# Patient Record
Sex: FEMALE | Race: WHITE | NOT HISPANIC OR LATINO | URBAN - METROPOLITAN AREA
[De-identification: names, ages, dates, MRNs, and addresses within clinical notes are randomized per-mention and may not be internally consistent; named-entity substitution may affect disease eponyms.]

---

## 2023-03-20 ENCOUNTER — TELEPHONE (OUTPATIENT)
Dept: PEDIATRICS | Facility: CLINIC | Age: 18
End: 2023-03-20
Payer: COMMERCIAL

## 2023-03-20 DIAGNOSIS — F90.9 ATTENTION DEFICIT HYPERACTIVITY DISORDER (ADHD), UNSPECIFIED ADHD TYPE: Primary | ICD-10-CM

## 2023-03-20 PROBLEM — Z86.16 HISTORY OF SEVERE ACUTE RESPIRATORY SYNDROME CORONAVIRUS 2 (SARS-COV-2) DISEASE: Status: RESOLVED | Noted: 2021-09-01 | Resolved: 2023-03-20

## 2023-03-20 PROBLEM — F40.10 SOCIAL ANXIETY DISORDER: Status: ACTIVE | Noted: 2023-03-20

## 2023-03-20 PROBLEM — E55.9 VITAMIN D DEFICIENCY: Status: RESOLVED | Noted: 2019-10-28 | Resolved: 2023-03-20

## 2023-03-20 PROBLEM — F32.A MODERATE DEPRESSIVE EPISODE: Status: ACTIVE | Noted: 2020-03-16

## 2023-03-20 PROBLEM — F90.0 ATTENTION DEFICIT HYPERACTIVITY DISORDER (ADHD), PREDOMINANTLY INATTENTIVE TYPE: Status: ACTIVE | Noted: 2018-12-10

## 2023-03-20 PROBLEM — F41.9 ANXIETY: Status: ACTIVE | Noted: 2019-03-29

## 2023-03-20 PROBLEM — E55.9 VITAMIN D DEFICIENCY: Status: ACTIVE | Noted: 2019-10-28

## 2023-03-20 NOTE — PROGRESS NOTES
Subjective   Virtual visit with  Dee Roper is a 17 y.o. female here for follow up of Attention Deficit       HPI:   Current Medication  Adderall 10mg, was rx to take 1-2.   Has been taking BID on workdays.  Likes it.  Hesitant to change to extended release because sometimes doesn't take 2nd dose and has more control with timing.  Works well,.  Recent medication changes none  Side effects none  Patient concerns really none!    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 I have considered the risks of abuse, dependence, addiction and diversion        The following portions of the chart were reviewed this encounter and updated as appropriate:         Review of Systems  Pertinent items are noted in HPI    PE  No Vitals today (virtual visit)  General: alert, active, in no acute distress  Lungs:  breathing unlabored        Assessment/Plan   Attention deficit disorder without hyperactivity    Overall, doing well on current medication.    I have personally reviewed the OARRS report for Dee Roper.  I have considered the risk of abuse, dependance, addiction, and diversion.  I believe it is clinically appropriate for this patient to continue taking this medication.  Will refill medication for an additional 3 months   Asking for rx to be sent to [facility] as 90d supply. Mom called and they are asking us to fax to   Risks/benefits /side effects of medications reviewed with patient/family  Summary:  It is my overall clinical impression that this patient is benefiting from stimulant therapy.  It is my clinical opinion that this patient will benefit from continued treatment with this current medication regimen.  The patient will continue to be treated with stimulant  medication.      Follow-up in 3 mo.  Should be In Person visit

## 2023-03-21 RX ORDER — METHYLPHENIDATE HYDROCHLORIDE 54 MG/1
54 TABLET ORAL DAILY
Qty: 7 TABLET | Refills: 0 | Status: SHIPPED | OUTPATIENT
Start: 2023-03-21 | End: 2023-03-28 | Stop reason: SINTOL

## 2023-03-25 DIAGNOSIS — F90.0 ATTENTION DEFICIT HYPERACTIVITY DISORDER (ADHD), PREDOMINANTLY INATTENTIVE TYPE: Primary | ICD-10-CM

## 2023-03-25 RX ORDER — DEXTROAMPHETAMINE SACCHARATE, AMPHETAMINE ASPARTATE, DEXTROAMPHETAMINE SULFATE, AND AMPHETAMINE SULFATE 2.5; 2.5; 2.5; 2.5 MG/1; MG/1; MG/1; MG/1
TABLET ORAL
COMMUNITY
Start: 2023-01-20 | End: 2023-03-25 | Stop reason: SDUPTHER

## 2023-03-25 RX ORDER — DEXTROAMPHETAMINE SACCHARATE, AMPHETAMINE ASPARTATE, DEXTROAMPHETAMINE SULFATE, AND AMPHETAMINE SULFATE 2.5; 2.5; 2.5; 2.5 MG/1; MG/1; MG/1; MG/1
10 TABLET ORAL DAILY
Qty: 7 TABLET | Refills: 0 | Status: SHIPPED | OUTPATIENT
Start: 2023-03-25 | End: 2023-03-28 | Stop reason: SDUPTHER

## 2023-03-25 NOTE — PROGRESS NOTES
Mom called (Ainsley on phone too).  Wrong rx was sent over (methylphenidate, not adderall).   Needs a few days of adderall until appt with me next week.   Will send.

## 2023-03-28 ENCOUNTER — TELEMEDICINE (OUTPATIENT)
Dept: PEDIATRICS | Facility: CLINIC | Age: 18
End: 2023-03-28
Payer: COMMERCIAL

## 2023-03-28 VITALS
BODY MASS INDEX: 27.22 KG/M2 | DIASTOLIC BLOOD PRESSURE: 71 MMHG | HEIGHT: 65 IN | WEIGHT: 163.4 LBS | HEART RATE: 67 BPM | SYSTOLIC BLOOD PRESSURE: 110 MMHG

## 2023-03-28 DIAGNOSIS — F90.0 ATTENTION DEFICIT HYPERACTIVITY DISORDER (ADHD), PREDOMINANTLY INATTENTIVE TYPE: Primary | ICD-10-CM

## 2023-03-28 PROCEDURE — 99213 OFFICE O/P EST LOW 20 MIN: CPT | Performed by: PEDIATRICS

## 2023-03-28 RX ORDER — METHYLPHENIDATE HYDROCHLORIDE 5 MG/1
1 TABLET ORAL DAILY
COMMUNITY
End: 2023-03-28 | Stop reason: ALTCHOICE

## 2023-03-28 RX ORDER — DEXTROAMPHETAMINE SACCHARATE, AMPHETAMINE ASPARTATE, DEXTROAMPHETAMINE SULFATE, AND AMPHETAMINE SULFATE 2.5; 2.5; 2.5; 2.5 MG/1; MG/1; MG/1; MG/1
10 TABLET ORAL 2 TIMES DAILY
Qty: 180 TABLET | Refills: 0 | Status: SHIPPED | OUTPATIENT
Start: 2023-03-28 | End: 2023-06-26

## 2023-03-29 ENCOUNTER — TELEPHONE (OUTPATIENT)
Dept: PEDIATRICS | Facility: CLINIC | Age: 18
End: 2023-03-29
Payer: COMMERCIAL

## 2023-03-29 DIAGNOSIS — F90.0 ATTENTION DEFICIT HYPERACTIVITY DISORDER (ADHD), PREDOMINANTLY INATTENTIVE TYPE: Primary | ICD-10-CM

## 2023-03-29 RX ORDER — DEXTROAMPHETAMINE SACCHARATE, AMPHETAMINE ASPARTATE, DEXTROAMPHETAMINE SULFATE AND AMPHETAMINE SULFATE 1.25; 1.25; 1.25; 1.25 MG/1; MG/1; MG/1; MG/1
TABLET ORAL
Qty: 40 TABLET | Refills: 0 | Status: SHIPPED | OUTPATIENT
Start: 2023-03-29

## 2023-03-29 NOTE — PROGRESS NOTES
Will try sending as 2, 5mg Adderall tabs twice a day for 10 days and see if insurance covers  (not covering the 10mg)

## 2023-04-06 DIAGNOSIS — F90.0 ATTENTION DEFICIT HYPERACTIVITY DISORDER (ADHD), PREDOMINANTLY INATTENTIVE TYPE: Primary | ICD-10-CM

## 2023-04-06 RX ORDER — DEXTROAMPHETAMINE SACCHARATE, AMPHETAMINE ASPARTATE, DEXTROAMPHETAMINE SULFATE AND AMPHETAMINE SULFATE 1.25; 1.25; 1.25; 1.25 MG/1; MG/1; MG/1; MG/1
TABLET ORAL
Qty: 360 TABLET | Refills: 0 | Status: SHIPPED | OUTPATIENT
Start: 2023-04-06

## 2023-04-06 RX ORDER — DEXTROAMPHETAMINE SACCHARATE, AMPHETAMINE ASPARTATE, DEXTROAMPHETAMINE SULFATE AND AMPHETAMINE SULFATE 1.25; 1.25; 1.25; 1.25 MG/1; MG/1; MG/1; MG/1
10 TABLET ORAL 2 TIMES DAILY
Qty: 56 TABLET | Refills: 0 | Status: SHIPPED | OUTPATIENT
Start: 2023-04-06 | End: 2023-04-20

## 2023-04-06 NOTE — PROGRESS NOTES
Pharmacy denied the adderall 10mg BID.   ([facility]) - reason unclear.  Will try and rx as adderall 5 mg - 2 tabs BID.  Spoke with mom to let her know.  In meantime, will send over rx adderall 5 mg, 2 tabs twice a day for 14 d to [facility] to tide over....

## 2023-11-29 ENCOUNTER — TELEPHONE (OUTPATIENT)
Dept: PEDIATRICS | Facility: CLINIC | Age: 18
End: 2023-11-29
Payer: COMMERCIAL

## 2023-11-29 NOTE — TELEPHONE ENCOUNTER
Rx Refill Request Telephone Encounter    Name:  Dee Roper  :  [id number]  Medication Name:  Adderall 5mg  Specific Pharmacy location:  [facility]  Date of last appointment:  23  Date of next appointment:  23  Best number to reach patient:  [phone]

## 2023-12-02 NOTE — PROGRESS NOTES
"Subjective   In person visit with  Dee  Dee Roper is a 18 y.o. female here for follow up of Attention Deficit   She is here with her mother      HPI:   Current Medication  Adderall 10mg (last time written as 2, 5mg tab BID to see if helped with cost).   Has actually been taking 7.5 mg twice a day.   Wondering if can try extended release now because a little annoying to have to take twice a day, sometimes feels like it wears off before 2nd dose. Side effects none  Patient concerns really none!    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   Finals in a week.  Adderall helps a lot with focus.   Doing well.         Self esteem: good  Family stressors: no new stressors  Mood:  stable      CSA signed 12/7/23  Urine drug screen urinated before appt.   Tried, but couldn't give urine sample here.  Will stop in  lab after finals (when on winter break ) and give sample.     No data recorded  I have personally reviewed the OARRS report for Dee Roper. I have considered the risks of abuse, dependence, addiction and diversion        The following portions of the chart were reviewed this encounter and updated as appropriate:         Review of Systems  Pertinent items are noted in HPI    PE  /71   Pulse 71   Ht 1.651 m (5' 5\")   Wt 72.4 kg (159 lb 9.6 oz)   LMP 12/07/2023 (Exact Date)   BMI 26.56 kg/m² .  General: alert, active, in no acute distress  Neck - no thyromegaly  Lungs:  breathing unlabored  CV - reg rate, rhythm        Assessment/Plan   Attention deficit disorder without hyperactivity    Discussed trialling adderall xr 15 mg.    Will RX for 30 d,   might try over break, might try when back to classes.   Will then call and let me know what she thing/be sure dosing is right, and then will decide what to call in for next 2 months.    Send to [facility]    I have personally reviewed the OARRS report for Dee Roper.  I have " considered the risk of abuse, dependance, addiction, and diversion.  I believe it is clinically appropriate for this patient to continue taking this medication.    Risks/benefits /side effects of medications reviewed with patient/family  Summary:  It is my overall clinical impression that this patient is benefiting from stimulant therapy.  It is my clinical opinion that this patient will benefit from continued treatment with this current medication regimen.  The patient will continue to be treated with stimulant medication.      Follow-up in 3 mo.  Should be In Person visit

## 2023-12-07 ENCOUNTER — OFFICE VISIT (OUTPATIENT)
Dept: PEDIATRICS | Facility: CLINIC | Age: 18
End: 2023-12-07
Payer: COMMERCIAL

## 2023-12-07 VITALS
SYSTOLIC BLOOD PRESSURE: 115 MMHG | DIASTOLIC BLOOD PRESSURE: 71 MMHG | HEIGHT: 65 IN | BODY MASS INDEX: 26.59 KG/M2 | HEART RATE: 71 BPM | WEIGHT: 159.6 LBS

## 2023-12-07 DIAGNOSIS — F90.0 ATTENTION DEFICIT HYPERACTIVITY DISORDER (ADHD), PREDOMINANTLY INATTENTIVE TYPE: Primary | ICD-10-CM

## 2023-12-07 PROCEDURE — 99213 OFFICE O/P EST LOW 20 MIN: CPT | Performed by: PEDIATRICS

## 2023-12-07 RX ORDER — DEXTROAMPHETAMINE SACCHARATE, AMPHETAMINE ASPARTATE MONOHYDRATE, DEXTROAMPHETAMINE SULFATE AND AMPHETAMINE SULFATE 3.75; 3.75; 3.75; 3.75 MG/1; MG/1; MG/1; MG/1
15 CAPSULE, EXTENDED RELEASE ORAL EVERY MORNING
Qty: 30 CAPSULE | Refills: 0 | Status: SHIPPED | OUTPATIENT
Start: 2023-12-07 | End: 2024-01-06

## 2023-12-19 ENCOUNTER — APPOINTMENT (OUTPATIENT)
Dept: PEDIATRICS | Facility: CLINIC | Age: 18
End: 2023-12-19
Payer: COMMERCIAL

## 2024-03-03 NOTE — PROGRESS NOTES
"Subjective   In person visit with  Dee  Dee Roper is a 18 y.o. female here for follow up of Attention Deficit   She is here with her mother      HPI:   Current Medication :   When last seen in Dec, changed from  Adderall 7.5mg bid.to Adderall xr 15mg.   Likes better on weekdays, on weekends prefers short acting because sometimes sleeps in.   No change in side effects.  In college- biology major.    Doing well.         Self esteem: good  Family stressors: no new stressors  Mood:  stable      CSA signed 12/7/23  Urine drug screen obtained today.  (Notes didn't take medication today nor past 2 days.   Needs to  next prescription)    No data recorded  I have personally reviewed the OARRS report for Dee Roper. I have considered the risks of abuse, dependence, addiction and diversion        The following portions of the chart were reviewed this encounter and updated as appropriate:         Review of Systems  Pertinent items are noted in HPI    PE  /68   Pulse 64   Ht 1.651 m (5' 5\")   Wt 71.5 kg (157 lb 9.6 oz)   LMP 01/12/2024 (Exact Date) Comment: skipped last period-not on BC  BMI 26.23 kg/m² .  General: alert, active, in no acute distress  Neck - no thyromegaly  Lungs:  breathing unlabored  CV - reg rate, rhythm        Assessment/Plan   Attention deficit disorder without hyperactivity    Adderall xr 15 mg every weekday am (mon thru Fri).   Will continue adderall short acting 7.5mg BID Sat and sun.    #22,, #24    I have personally reviewed the OARRS report for Dee Roper.  I have considered the risk of abuse, dependance, addiction, and diversion.  I believe it is clinically appropriate for this patient to continue taking this medication.    Risks/benefits /side effects of medications reviewed with patient/family  Summary:  It is my overall clinical impression that this patient is benefiting from stimulant therapy.  It is my clinical opinion that this patient will " benefit from continued treatment with this current medication regimen.  The patient will continue to be treated with stimulant medication.      Follow-up in 3 mo.

## 2024-03-05 ENCOUNTER — OFFICE VISIT (OUTPATIENT)
Dept: PEDIATRICS | Facility: CLINIC | Age: 19
End: 2024-03-05
Payer: COMMERCIAL

## 2024-03-05 VITALS
SYSTOLIC BLOOD PRESSURE: 114 MMHG | HEART RATE: 64 BPM | HEIGHT: 65 IN | WEIGHT: 157.6 LBS | DIASTOLIC BLOOD PRESSURE: 68 MMHG | BODY MASS INDEX: 26.26 KG/M2

## 2024-03-05 DIAGNOSIS — F90.0 ATTENTION DEFICIT HYPERACTIVITY DISORDER (ADHD), PREDOMINANTLY INATTENTIVE TYPE: Primary | ICD-10-CM

## 2024-03-05 PROCEDURE — 99213 OFFICE O/P EST LOW 20 MIN: CPT | Performed by: PEDIATRICS

## 2024-03-05 PROCEDURE — 80307 DRUG TEST PRSMV CHEM ANLYZR: CPT

## 2024-03-05 RX ORDER — DEXTROAMPHETAMINE SACCHARATE, AMPHETAMINE ASPARTATE, DEXTROAMPHETAMINE SULFATE AND AMPHETAMINE SULFATE 1.25; 1.25; 1.25; 1.25 MG/1; MG/1; MG/1; MG/1
TABLET ORAL
Qty: 24 TABLET | Refills: 0 | Status: SHIPPED | OUTPATIENT
Start: 2024-04-04

## 2024-03-05 RX ORDER — DEXTROAMPHETAMINE SACCHARATE, AMPHETAMINE ASPARTATE MONOHYDRATE, DEXTROAMPHETAMINE SULFATE AND AMPHETAMINE SULFATE 3.75; 3.75; 3.75; 3.75 MG/1; MG/1; MG/1; MG/1
CAPSULE, EXTENDED RELEASE ORAL
Qty: 22 CAPSULE | Refills: 0 | Status: SHIPPED | OUTPATIENT
Start: 2024-05-04

## 2024-03-05 RX ORDER — DEXTROAMPHETAMINE SACCHARATE, AMPHETAMINE ASPARTATE MONOHYDRATE, DEXTROAMPHETAMINE SULFATE AND AMPHETAMINE SULFATE 3.75; 3.75; 3.75; 3.75 MG/1; MG/1; MG/1; MG/1
CAPSULE, EXTENDED RELEASE ORAL
Qty: 22 CAPSULE | Refills: 0 | Status: SHIPPED | OUTPATIENT
Start: 2024-03-05

## 2024-03-05 RX ORDER — DEXTROAMPHETAMINE SACCHARATE, AMPHETAMINE ASPARTATE, DEXTROAMPHETAMINE SULFATE AND AMPHETAMINE SULFATE 1.25; 1.25; 1.25; 1.25 MG/1; MG/1; MG/1; MG/1
TABLET ORAL
Qty: 24 TABLET | Refills: 0 | Status: SHIPPED | OUTPATIENT
Start: 2024-05-04

## 2024-03-05 RX ORDER — DEXTROAMPHETAMINE SACCHARATE, AMPHETAMINE ASPARTATE, DEXTROAMPHETAMINE SULFATE AND AMPHETAMINE SULFATE 1.25; 1.25; 1.25; 1.25 MG/1; MG/1; MG/1; MG/1
TABLET ORAL
Qty: 24 TABLET | Refills: 0 | Status: SHIPPED | OUTPATIENT
Start: 2024-03-05

## 2024-03-05 RX ORDER — DEXTROAMPHETAMINE SACCHARATE, AMPHETAMINE ASPARTATE MONOHYDRATE, DEXTROAMPHETAMINE SULFATE AND AMPHETAMINE SULFATE 3.75; 3.75; 3.75; 3.75 MG/1; MG/1; MG/1; MG/1
CAPSULE, EXTENDED RELEASE ORAL
Qty: 22 CAPSULE | Refills: 0 | Status: SHIPPED | OUTPATIENT
Start: 2024-04-04

## 2024-03-05 ASSESSMENT — PATIENT HEALTH QUESTIONNAIRE - PHQ9
1. LITTLE INTEREST OR PLEASURE IN DOING THINGS: NOT AT ALL
SUM OF ALL RESPONSES TO PHQ9 QUESTIONS 1 AND 2: 0
2. FEELING DOWN, DEPRESSED OR HOPELESS: NOT AT ALL

## 2024-03-06 LAB
AMPHETAMINES UR QL SCN: NORMAL
BARBITURATES UR QL SCN: NORMAL
BENZODIAZ UR QL SCN: NORMAL
BZE UR QL SCN: NORMAL
CANNABINOIDS UR QL SCN: NORMAL
FENTANYL+NORFENTANYL UR QL SCN: NORMAL
OPIATES UR QL SCN: NORMAL
OXYCODONE+OXYMORPHONE UR QL SCN: NORMAL
PCP UR QL SCN: NORMAL

## 2024-08-05 NOTE — PROGRESS NOTES
"Subjective   History was provided by the  patient .  Dee Roper is a 18 y.o. female who is here for this well-child visit and ADD med check  IMM - Men B #2, Tdap - discussed/recommended.   Pt. Declines today    ADD:  Meds: would like to continue with the Short Acting adderall for this coming school year.   Adderall short acting 7.5mg BID   Benefits:  Helps with focus  Has been sleeping ok.  Side effects: non really  OARRS reviewed.  CSA - signed 12/7/23  Urine drug screen 3/24.    Current Issues:  Current concerns include doesn't want shots today  Menses  regular recently  Sexually active? no   Sleep: all night    Review of Nutrition:  Vitamin not taking  Balanced diet? yes  Constipation? No    Social Screening:   School performance: Biomedical engineering (switched from biology)  will be a sophomore.  Did well.  Group of friends.   Involved with campus ministry.   Will also be living with girls in [employer].   Will be cooking more.   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 no  Alcohol/substance use?  no  PHQ-9 , 8  TB ques  Low Risk    Objective   Visit Vitals  /84 (BP Location: Left arm, Patient Position: Sitting)   Pulse 100   Ht 1.648 m (5' 4.88\")   Wt 75.1 kg (165 lb 8 oz)   BMI 27.64 kg/m²   Smoking Status Never   BSA 1.85 m²      Growth parameters are noted and are appropriate for age.  General:   alert and oriented, in no acute distress   Gait:   normal   Skin:   normal   Oral cavity:   lips, mucosa, and tongue normal; teeth and gums normal   Eyes:   sclerae white, pupils equal and reactive   Ears:   normal bilaterally   Neck:  no adenopathy and thyroid not enlarged, symmetric, no tenderness/mass/nodules     Lungs:  clear to auscultation bilaterally   Heart:   regular rate and rhythm, S1, S2 normal, no murmur, click, rub or gallop   Abdomen:  soft, non-tender; bowel sounds normal; no masses, no " organomegaly   :  exam deferred          Extremities:  extremities normal, warm and well-perfused; no cyanosis, clubbing, or edema, negative forward bend   Neuro:  normal without focal findings and muscle tone and strength normal and symmetric     Assessment/Plan   Well adult    ADD:  Adderall 7.5 mg bid #90.   Rx for 3 mo sent.  1. Anticipatory guidance discussed.   2.  Growth and weight gain appropriate. The patient was counseled regarding nutrition and physical activity.  3. Development: appropriate for age  4.  Ok for all activity  5. Vaccines -prefers none today (though discussed and recommended)  6. Follow up in 1 year for next well child exam or sooner with concerns.    3 mo for med check

## 2024-08-08 ENCOUNTER — APPOINTMENT (OUTPATIENT)
Dept: PEDIATRICS | Facility: CLINIC | Age: 19
End: 2024-08-08
Payer: COMMERCIAL

## 2024-08-08 VITALS
BODY MASS INDEX: 27.57 KG/M2 | HEIGHT: 65 IN | SYSTOLIC BLOOD PRESSURE: 118 MMHG | WEIGHT: 165.5 LBS | DIASTOLIC BLOOD PRESSURE: 84 MMHG | HEART RATE: 100 BPM

## 2024-08-08 DIAGNOSIS — F90.0 ATTENTION DEFICIT HYPERACTIVITY DISORDER (ADHD), PREDOMINANTLY INATTENTIVE TYPE: Primary | ICD-10-CM

## 2024-08-08 DIAGNOSIS — Z00.00 WELLNESS EXAMINATION: ICD-10-CM

## 2024-08-08 PROCEDURE — 3008F BODY MASS INDEX DOCD: CPT | Performed by: PEDIATRICS

## 2024-08-08 PROCEDURE — 96127 BRIEF EMOTIONAL/BEHAV ASSMT: CPT | Performed by: PEDIATRICS

## 2024-08-08 PROCEDURE — 99213 OFFICE O/P EST LOW 20 MIN: CPT | Performed by: PEDIATRICS

## 2024-08-08 PROCEDURE — 1036F TOBACCO NON-USER: CPT | Performed by: PEDIATRICS

## 2024-08-08 PROCEDURE — 99395 PREV VISIT EST AGE 18-39: CPT | Performed by: PEDIATRICS

## 2024-08-08 RX ORDER — DEXTROAMPHETAMINE SACCHARATE, AMPHETAMINE ASPARTATE, DEXTROAMPHETAMINE SULFATE AND AMPHETAMINE SULFATE 1.25; 1.25; 1.25; 1.25 MG/1; MG/1; MG/1; MG/1
TABLET ORAL
Qty: 90 TABLET | Refills: 0 | Status: SHIPPED | OUTPATIENT
Start: 2024-08-08

## 2024-08-08 RX ORDER — DEXTROAMPHETAMINE SACCHARATE, AMPHETAMINE ASPARTATE, DEXTROAMPHETAMINE SULFATE AND AMPHETAMINE SULFATE 1.25; 1.25; 1.25; 1.25 MG/1; MG/1; MG/1; MG/1
TABLET ORAL
Qty: 90 TABLET | Refills: 0 | Status: SHIPPED | OUTPATIENT
Start: 2024-10-07

## 2024-08-08 RX ORDER — DEXTROAMPHETAMINE SACCHARATE, AMPHETAMINE ASPARTATE, DEXTROAMPHETAMINE SULFATE AND AMPHETAMINE SULFATE 1.25; 1.25; 1.25; 1.25 MG/1; MG/1; MG/1; MG/1
TABLET ORAL
Qty: 90 TABLET | Refills: 0 | Status: SHIPPED | OUTPATIENT
Start: 2024-09-07

## 2024-08-08 ASSESSMENT — PATIENT HEALTH QUESTIONNAIRE - PHQ9
SUM OF ALL RESPONSES TO PHQ9 QUESTIONS 1 AND 2: 2
SUM OF ALL RESPONSES TO PHQ QUESTIONS 1-9: 8
9. THOUGHTS THAT YOU WOULD BE BETTER OFF DEAD, OR OF HURTING YOURSELF: NOT AT ALL
2. FEELING DOWN, DEPRESSED OR HOPELESS: SEVERAL DAYS
8. MOVING OR SPEAKING SO SLOWLY THAT OTHER PEOPLE COULD HAVE NOTICED. OR THE OPPOSITE, BEING SO FIGETY OR RESTLESS THAT YOU HAVE BEEN MOVING AROUND A LOT MORE THAN USUAL: NOT AT ALL
1. LITTLE INTEREST OR PLEASURE IN DOING THINGS: SEVERAL DAYS
7. TROUBLE CONCENTRATING ON THINGS, SUCH AS READING THE NEWSPAPER OR WATCHING TELEVISION: NEARLY EVERY DAY
3. TROUBLE FALLING OR STAYING ASLEEP OR SLEEPING TOO MUCH: NOT AT ALL
6. FEELING BAD ABOUT YOURSELF - OR THAT YOU ARE A FAILURE OR HAVE LET YOURSELF OR YOUR FAMILY DOWN: MORE THAN HALF THE DAYS
4. FEELING TIRED OR HAVING LITTLE ENERGY: NOT AT ALL
5. POOR APPETITE OR OVEREATING: SEVERAL DAYS
10. IF YOU CHECKED OFF ANY PROBLEMS, HOW DIFFICULT HAVE THESE PROBLEMS MADE IT FOR YOU TO DO YOUR WORK, TAKE CARE OF THINGS AT HOME, OR GET ALONG WITH OTHER PEOPLE: SOMEWHAT DIFFICULT